# Patient Record
Sex: MALE | URBAN - METROPOLITAN AREA
[De-identification: names, ages, dates, MRNs, and addresses within clinical notes are randomized per-mention and may not be internally consistent; named-entity substitution may affect disease eponyms.]

---

## 2022-11-18 ENCOUNTER — OFFICE (OUTPATIENT)
Dept: URBAN - METROPOLITAN AREA CLINIC 29 | Facility: CLINIC | Age: 82
Setting detail: OPHTHALMOLOGY
End: 2022-11-18
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
## 2022-11-18 PROCEDURE — NO SHOW FE NO SHOW FEE: Performed by: OPHTHALMOLOGY

## 2023-03-29 ENCOUNTER — OFFICE (OUTPATIENT)
Dept: URBAN - METROPOLITAN AREA CLINIC 30 | Facility: CLINIC | Age: 83
Setting detail: OPHTHALMOLOGY
End: 2023-03-29
Payer: MEDICARE

## 2023-03-29 DIAGNOSIS — H52.13: ICD-10-CM

## 2023-03-29 DIAGNOSIS — H01.002: ICD-10-CM

## 2023-03-29 DIAGNOSIS — H02.403: ICD-10-CM

## 2023-03-29 DIAGNOSIS — H01.001: ICD-10-CM

## 2023-03-29 DIAGNOSIS — H01.004: ICD-10-CM

## 2023-03-29 DIAGNOSIS — H16.223: ICD-10-CM

## 2023-03-29 DIAGNOSIS — Z96.1: ICD-10-CM

## 2023-03-29 DIAGNOSIS — H43.813: ICD-10-CM

## 2023-03-29 DIAGNOSIS — H01.005: ICD-10-CM

## 2023-03-29 DIAGNOSIS — H11.153: ICD-10-CM

## 2023-03-29 PROCEDURE — 92014 COMPRE OPH EXAM EST PT 1/>: CPT | Performed by: OPHTHALMOLOGY

## 2023-03-29 ASSESSMENT — REFRACTION_MANIFEST
OS_VA1: 20/30+
OS_SPHERE: -1.25
OD_CYLINDER: +0.25
OD_VA1: 20/25+
OD_CYLINDER: +1.00
OS_AXIS: 134
OD_VA1: 20/25
OS_VA1: 20/25
OD_SPHERE: -1.25
OD_AXIS: 180
OD_SPHERE: -1.00
OS_SPHERE: -0.75
OS_CYLINDER: +0.25
OU_VA: 20/20
OS_CYLINDER: +0.25
OS_AXIS: 055
OD_AXIS: 2

## 2023-03-29 ASSESSMENT — VISUAL ACUITY
OD_BCVA: 20/25
OS_BCVA: 20/30

## 2023-03-29 ASSESSMENT — KERATOMETRY
OD_K1POWER_DIOPTERS: 42.25
OS_K2POWER_DIOPTERS: 43.50
OD_AXISANGLE_DEGREES: 108
OS_K1POWER_DIOPTERS: 42.50
OS_AXISANGLE_DEGREES: 131
OD_K2POWER_DIOPTERS: 42.50

## 2023-03-29 ASSESSMENT — REFRACTION_AUTOREFRACTION
OS_AXIS: 134
OD_SPHERE: -1.25
OS_SPHERE: -0.75
OD_AXIS: 2
OD_CYLINDER: +1.00
OS_CYLINDER: +0.25

## 2023-03-29 ASSESSMENT — SPHEQUIV_DERIVED
OS_SPHEQUIV: -1.125
OD_SPHEQUIV: -0.875
OS_SPHEQUIV: -0.625
OD_SPHEQUIV: -0.75
OD_SPHEQUIV: -0.75
OS_SPHEQUIV: -0.625

## 2023-03-29 ASSESSMENT — AXIALLENGTH_DERIVED
OD_AL: 24.3709
OS_AL: 24.2307
OD_AL: 24.3191
OD_AL: 24.3191
OS_AL: 24.0269
OS_AL: 24.0269

## 2023-03-29 ASSESSMENT — REFRACTION_CURRENTRX
OD_SPHERE: +1.50
OS_OVR_VA: 20/
OS_SPHERE: +1.50
OS_CYLINDER: SPH
OD_OVR_VA: 20/
OD_CYLINDER: SPH

## 2023-03-29 ASSESSMENT — CORNEAL DYSTROPHY - POSTERIOR
OD_POSTERIORDYSTROPHY: T GUTTATA
OS_POSTERIORDYSTROPHY: T

## 2023-03-29 ASSESSMENT — LID POSITION - PTOSIS
OD_PTOSIS: 1+
OS_PTOSIS: 1+

## 2023-03-29 ASSESSMENT — LID EXAM ASSESSMENTS
OD_BLEPHARITIS: 2+
OS_BLEPHARITIS: 2+

## 2023-03-29 ASSESSMENT — CONFRONTATIONAL VISUAL FIELD TEST (CVF)
OD_FINDINGS: FULL
OS_FINDINGS: FULL

## 2023-03-29 ASSESSMENT — TONOMETRY
OD_IOP_MMHG: 14
OS_IOP_MMHG: 14

## 2023-03-29 ASSESSMENT — SUPERFICIAL PUNCTATE KERATITIS (SPK)
OD_SPK: 1+
OS_SPK: 1+

## 2023-10-25 ENCOUNTER — RX ONLY (RX ONLY)
Age: 83
End: 2023-10-25

## 2023-10-25 ENCOUNTER — OFFICE (OUTPATIENT)
Dept: URBAN - METROPOLITAN AREA CLINIC 30 | Facility: CLINIC | Age: 83
Setting detail: OPHTHALMOLOGY
End: 2023-10-25
Payer: MEDICARE

## 2023-10-25 DIAGNOSIS — H26.491: ICD-10-CM

## 2023-10-25 DIAGNOSIS — H02.403: ICD-10-CM

## 2023-10-25 DIAGNOSIS — H16.223: ICD-10-CM

## 2023-10-25 DIAGNOSIS — H01.001: ICD-10-CM

## 2023-10-25 PROCEDURE — 99214 OFFICE O/P EST MOD 30 MIN: CPT | Performed by: OPHTHALMOLOGY

## 2023-10-25 ASSESSMENT — AXIALLENGTH_DERIVED
OS_AL: 24.0776
OS_AL: 24.2307
OD_AL: 24.3191
OD_AL: 24.3709

## 2023-10-25 ASSESSMENT — LID POSITION - PTOSIS
OD_PTOSIS: 1+
OS_PTOSIS: 1+

## 2023-10-25 ASSESSMENT — REFRACTION_CURRENTRX
OS_OVR_VA: 20/
OS_SPHERE: +1.50
OS_CYLINDER: SPH
OD_CYLINDER: SPH
OD_SPHERE: +1.50
OD_OVR_VA: 20/

## 2023-10-25 ASSESSMENT — SUPERFICIAL PUNCTATE KERATITIS (SPK)
OS_SPK: 1+
OD_SPK: 1+

## 2023-10-25 ASSESSMENT — SPHEQUIV_DERIVED
OS_SPHEQUIV: -1.125
OD_SPHEQUIV: -0.75
OS_SPHEQUIV: -0.75
OD_SPHEQUIV: -0.875

## 2023-10-25 ASSESSMENT — CONFRONTATIONAL VISUAL FIELD TEST (CVF)
OS_FINDINGS: FULL
OD_FINDINGS: FULL

## 2023-10-25 ASSESSMENT — REFRACTION_MANIFEST
OS_VA1: 20/25
OD_SPHERE: -1.00
OD_AXIS: 180
OD_CYLINDER: +0.25
OS_AXIS: 055
OD_AXIS: 180
OU_VA: 20/20
OD_CYLINDER: +1.00
OS_CYLINDER: +0.25
OD_SPHERE: -1.25
OS_SPHERE: -0.75
OS_SPHERE: -1.25
OS_VA1: 20/30+
OD_VA1: 20/25+
OD_VA1: 20/100

## 2023-10-25 ASSESSMENT — CORNEAL DYSTROPHY - POSTERIOR
OS_POSTERIORDYSTROPHY: T
OD_POSTERIORDYSTROPHY: T GUTTATA

## 2023-10-25 ASSESSMENT — REFRACTION_AUTOREFRACTION
OS_SPHERE: -1.00
OS_AXIS: 129
OS_CYLINDER: +0.50
OD_SPHERE: UNABLE

## 2023-10-25 ASSESSMENT — KERATOMETRY
OD_K2POWER_DIOPTERS: 42.50
OS_K2POWER_DIOPTERS: 43.50
OS_AXISANGLE_DEGREES: 131
OD_K1POWER_DIOPTERS: 42.25
OD_AXISANGLE_DEGREES: 108
OS_K1POWER_DIOPTERS: 42.50

## 2023-10-25 ASSESSMENT — LID EXAM ASSESSMENTS
OD_BLEPHARITIS: 2+
OS_BLEPHARITIS: 2+

## 2023-10-25 ASSESSMENT — TONOMETRY
OD_IOP_MMHG: 14
OS_IOP_MMHG: 14

## 2023-10-25 ASSESSMENT — VISUAL ACUITY
OD_BCVA: 20/50-2
OS_BCVA: 20/100-2

## 2023-11-16 ENCOUNTER — OFFICE (OUTPATIENT)
Dept: URBAN - METROPOLITAN AREA CLINIC 29 | Facility: CLINIC | Age: 83
Setting detail: OPHTHALMOLOGY
End: 2023-11-16
Payer: MEDICARE

## 2023-11-16 ENCOUNTER — RX ONLY (RX ONLY)
Age: 83
End: 2023-11-16

## 2023-11-16 DIAGNOSIS — H26.491: ICD-10-CM

## 2023-11-16 PROBLEM — H01.001 BLEPHARITIS; RIGHT UPPER LID, LEFT UPPER LID: Status: ACTIVE | Noted: 2023-11-16

## 2023-11-16 PROBLEM — H00.11 CHALAZION; RIGHT UPPER LID: Status: ACTIVE | Noted: 2023-11-16

## 2023-11-16 PROBLEM — H01.004 BLEPHARITIS; RIGHT UPPER LID, LEFT UPPER LID: Status: ACTIVE | Noted: 2023-11-16

## 2023-11-16 PROBLEM — H59.021 LENS FRAGMENTS IN EYE FOLLOWING CATARACT SURGERY; RIGHT EYE: Status: ACTIVE | Noted: 2023-11-16

## 2023-11-16 PROCEDURE — 66821 AFTER CATARACT LASER SURGERY: CPT | Mod: RT | Performed by: OPHTHALMOLOGY

## 2023-11-16 ASSESSMENT — REFRACTION_CURRENTRX
OD_CYLINDER: SPH
OD_OVR_VA: 20/
OD_SPHERE: +1.50
OS_OVR_VA: 20/
OS_SPHERE: +1.50
OS_CYLINDER: SPH

## 2023-11-16 ASSESSMENT — CONFRONTATIONAL VISUAL FIELD TEST (CVF)
OD_FINDINGS: FULL
OS_FINDINGS: FULL

## 2023-11-16 ASSESSMENT — REFRACTION_AUTOREFRACTION
OS_SPHERE: -1.00
OS_AXIS: 129
OS_CYLINDER: +0.50
OD_SPHERE: UNABLE

## 2023-11-16 ASSESSMENT — SPHEQUIV_DERIVED
OD_SPHEQUIV: -0.875
OD_SPHEQUIV: -0.75
OS_SPHEQUIV: -0.75
OS_SPHEQUIV: -1.125

## 2023-11-16 ASSESSMENT — LID POSITION - PTOSIS
OS_PTOSIS: 1+
OD_PTOSIS: 1+

## 2023-11-16 ASSESSMENT — REFRACTION_MANIFEST
OU_VA: 20/20
OS_VA1: 20/25
OD_SPHERE: -1.25
OS_SPHERE: -0.75
OS_CYLINDER: +0.25
OD_AXIS: 180
OD_AXIS: 180
OD_VA1: 20/25+
OD_VA1: 20/100
OS_SPHERE: -1.25
OS_VA1: 20/30+
OD_CYLINDER: +1.00
OS_AXIS: 055
OD_CYLINDER: +0.25
OD_SPHERE: -1.00

## 2023-11-16 ASSESSMENT — SUPERFICIAL PUNCTATE KERATITIS (SPK)
OD_SPK: 1+
OS_SPK: 1+

## 2023-11-16 ASSESSMENT — CORNEAL DYSTROPHY - POSTERIOR
OD_POSTERIORDYSTROPHY: T GUTTATA
OS_POSTERIORDYSTROPHY: T

## 2023-11-16 ASSESSMENT — LID EXAM ASSESSMENTS
OD_BLEPHARITIS: 2+
OS_BLEPHARITIS: 2+

## 2024-02-02 ENCOUNTER — OFFICE (OUTPATIENT)
Dept: URBAN - METROPOLITAN AREA CLINIC 29 | Facility: CLINIC | Age: 84
Setting detail: OPHTHALMOLOGY
End: 2024-02-02
Payer: MEDICARE

## 2024-02-02 DIAGNOSIS — Z96.1: ICD-10-CM

## 2024-02-02 DIAGNOSIS — H01.001: ICD-10-CM

## 2024-02-02 DIAGNOSIS — H01.004: ICD-10-CM

## 2024-02-02 DIAGNOSIS — H52.13: ICD-10-CM

## 2024-02-02 DIAGNOSIS — H16.223: ICD-10-CM

## 2024-02-02 DIAGNOSIS — H43.813: ICD-10-CM

## 2024-02-02 DIAGNOSIS — H26.491: ICD-10-CM

## 2024-02-02 DIAGNOSIS — H59.021: ICD-10-CM

## 2024-02-02 DIAGNOSIS — H02.403: ICD-10-CM

## 2024-02-02 DIAGNOSIS — H11.153: ICD-10-CM

## 2024-02-02 PROCEDURE — 99024 POSTOP FOLLOW-UP VISIT: CPT | Performed by: OPHTHALMOLOGY

## 2024-02-02 ASSESSMENT — REFRACTION_MANIFEST
OD_SPHERE: -1.00
OD_CYLINDER: +1.00
OS_SPHERE: -1.25
OS_SPHERE: -0.75
OD_VA1: 20/25+
OS_CYLINDER: +0.25
OS_VA1: 20/30+
OS_AXIS: 055
OD_SPHERE: -1.25
OD_AXIS: 180
OD_CYLINDER: +0.25
OS_VA1: 20/25
OD_VA1: 20/100
OD_AXIS: 180
OU_VA: 20/20

## 2024-02-02 ASSESSMENT — REFRACTION_AUTOREFRACTION
OS_AXIS: 129
OS_SPHERE: -1.00
OS_CYLINDER: +0.50
OD_SPHERE: UNABLE

## 2024-02-02 ASSESSMENT — SPHEQUIV_DERIVED
OS_SPHEQUIV: -1.125
OS_SPHEQUIV: -0.75
OD_SPHEQUIV: -0.75
OD_SPHEQUIV: -0.875

## 2024-02-02 ASSESSMENT — LID EXAM ASSESSMENTS
OD_BLEPHARITIS: 2+
OS_BLEPHARITIS: 2+

## 2024-02-02 ASSESSMENT — LID POSITION - PTOSIS
OS_PTOSIS: 1+
OD_PTOSIS: 1+

## 2024-02-02 ASSESSMENT — CONFRONTATIONAL VISUAL FIELD TEST (CVF)
OD_FINDINGS: FULL
OS_FINDINGS: FULL

## 2024-02-02 ASSESSMENT — CORNEAL DYSTROPHY - POSTERIOR
OD_POSTERIORDYSTROPHY: T GUTTATA
OS_POSTERIORDYSTROPHY: T

## 2024-02-02 ASSESSMENT — REFRACTION_CURRENTRX
OS_OVR_VA: 20/
OD_CYLINDER: SPH
OD_SPHERE: +1.50
OS_SPHERE: +1.50
OS_CYLINDER: SPH
OD_OVR_VA: 20/

## 2024-02-02 ASSESSMENT — SUPERFICIAL PUNCTATE KERATITIS (SPK)
OD_SPK: 1+
OS_SPK: 1+

## 2024-09-04 ENCOUNTER — TRANSCRIPTION ENCOUNTER (OUTPATIENT)
Age: 84
End: 2024-09-04

## 2024-09-06 ENCOUNTER — TRANSCRIPTION ENCOUNTER (OUTPATIENT)
Age: 84
End: 2024-09-06

## 2024-09-09 ENCOUNTER — APPOINTMENT (OUTPATIENT)
Dept: CARE COORDINATION | Facility: HOME HEALTH | Age: 84
End: 2024-09-09
Payer: MEDICARE

## 2024-09-09 VITALS
SYSTOLIC BLOOD PRESSURE: 135 MMHG | TEMPERATURE: 98.6 F | OXYGEN SATURATION: 96 % | DIASTOLIC BLOOD PRESSURE: 78 MMHG | RESPIRATION RATE: 18 BRPM | HEART RATE: 71 BPM

## 2024-09-09 DIAGNOSIS — E78.5 HYPERLIPIDEMIA, UNSPECIFIED: ICD-10-CM

## 2024-09-09 DIAGNOSIS — J18.9 PNEUMONIA, UNSPECIFIED ORGANISM: ICD-10-CM

## 2024-09-09 DIAGNOSIS — I10 ESSENTIAL (PRIMARY) HYPERTENSION: ICD-10-CM

## 2024-09-09 PROBLEM — Z00.00 ENCOUNTER FOR PREVENTIVE HEALTH EXAMINATION: Status: ACTIVE | Noted: 2024-09-09

## 2024-09-09 PROCEDURE — 99349 HOME/RES VST EST MOD MDM 40: CPT

## 2024-09-09 RX ORDER — BENAZEPRIL HYDROCHLORIDE 5 MG/1
5 TABLET ORAL
Refills: 0 | Status: ACTIVE | COMMUNITY

## 2024-09-09 RX ORDER — AMLODIPINE BESYLATE 10 MG/1
10 TABLET ORAL
Refills: 0 | Status: ACTIVE | COMMUNITY

## 2024-09-09 RX ORDER — ALLOPURINOL 300 MG/1
300 TABLET ORAL
Refills: 0 | Status: ACTIVE | COMMUNITY

## 2024-09-09 RX ORDER — ROSUVASTATIN CALCIUM 40 MG/1
40 TABLET, FILM COATED ORAL
Refills: 0 | Status: ACTIVE | COMMUNITY

## 2024-09-09 NOTE — PHYSICAL EXAM
[No Acute Distress] : no acute distress [Well Nourished] : well nourished [Well Developed] : well developed [Well-Appearing] : well-appearing [Normal Sclera/Conjunctiva] : normal sclera/conjunctiva [Normal Outer Ear/Nose] : the outer ears and nose were normal in appearance [No JVD] : no jugular venous distention [No Respiratory Distress] : no respiratory distress  [No Accessory Muscle Use] : no accessory muscle use [Bibasilar Rales/Crackles] : bibasilar rales/crackles were heard [Normal Rate] : normal rate  [Regular Rhythm] : with a regular rhythm [Normal S1, S2] : normal S1 and S2 [No Edema] : there was no peripheral edema [Soft] : abdomen soft [Non Tender] : non-tender [Non-distended] : non-distended [Normal Bowel Sounds] : normal bowel sounds [No Rash] : no rash [Normal Gait] : normal gait [Normal Affect] : the affect was normal [Normal Insight/Judgement] : insight and judgment were intact

## 2024-09-09 NOTE — REVIEW OF SYSTEMS
[Fever] : no fever [Chills] : no chills [Fatigue] : fatigue [Hot Flashes] : no hot flashes [Night Sweats] : no night sweats [Chest Pain] : no chest pain [Palpitations] : no palpitations [Claudication] : no  leg claudication [Lower Ext Edema] : no lower extremity edema [Orthopena] : no orthopnea [Wheezing] : no wheezing [Cough] : cough [Dyspnea on Exertion] : not dyspnea on exertion [Abdominal Pain] : no abdominal pain [Nausea] : no nausea [Constipation] : no constipation [Diarrhea] : no diarrhea [Vomiting] : no vomiting [Dysuria] : no dysuria [Incontinence] : no incontinence [Hematuria] : no hematuria [Joint Pain] : no joint pain [Joint Stiffness] : no joint stiffness [Back Pain] : no back pain [Itching] : no itching [Skin Rash] : no skin rash [Headache] : no headache [Dizziness] : no dizziness [Confusion] : no confusion [Memory Loss] : no memory loss [Suicidal] : not suicidal [Insomnia] : no insomnia [Anxiety] : no anxiety [Depression] : no depression [Easy Bleeding] : no easy bleeding [Easy Bruising] : no easy bruising [Negative] : ENT

## 2024-09-09 NOTE — HISTORY OF PRESENT ILLNESS
[Spouse] : spouse [FreeTextEntry1] : Follow-up for discharge from [facility] for PNA.  [de-identified] : Patient is a 83 year old male enrolled in the [employer] program with a history of HTN, HLD, gout, microscopic hematuria, bilateral sensorineural hearing loss, large hiatal hernia, systolic heart murmur. Patient was admitted to [facility] for PNA.    Hospital chart reviewed and copied as per [facility] Discharge Summary: "Mr. Wooten is a 83yoM with PMH HTN, HLD, gout, microscopic hematuria, bilateral sensorineural hearing loss, large hiatal hernia, systolic heart murmur who presents with cough for about a week, worsening at night for the past 1-2 days with fever for a day, a/w acute hypoxemic respiratory failure due to LLL pna".    Patient observed via home visit and is alert and oriented x 3, in no acute distress. Patient states they are feeling well today. Patient states they are eating and drinking well. Patient reports compliance with and completion of prescribed antibiotics. Reports mild residual cough but improved. States he is exercising as tolerated. Reports he has f/u with PCP for 9/13/24. Denies any fever, chills, abdominal pain, palpitations, nausea, vomiting, diarrhea, lightheadedness, dizziness, shortness of breath, or chest pain.

## 2024-09-09 NOTE — ASSESSMENT
[FreeTextEntry1] : Patient is a 83 year old male enrolled in the [employer] program with a history of HTN, HLD, gout, microscopic hematuria, bilateral sensorineural hearing loss, large hiatal hernia, systolic heart murmur. Patient was admitted to [facility] for PNA.    Hospital chart reviewed and copied as per [facility] Discharge Summary: "Mr. Wooten is a 83yoM with PMH HTN, HLD, gout, microscopic hematuria, bilateral sensorineural hearing loss, large hiatal hernia, systolic heart murmur who presents with cough for about a week, worsening at night for the past 1-2 days with fever for a day, a/w acute hypoxemic respiratory failure due to LLL pna".    Patient observed via home visit and is alert and oriented x 3, in no acute distress. Patient states they are feeling well today. Patient states they are eating and drinking well. Patient reports compliance with and completion of prescribed antibiotics. Reports mild residual cough but improved. States he is exercising as tolerated. Reports he has f/u with PCP for 9/13/24. Denies any fever, chills, abdominal pain, palpitations, nausea, vomiting, diarrhea, lightheadedness, dizziness, shortness of breath, or chest pain.

## 2024-09-18 ENCOUNTER — TRANSCRIPTION ENCOUNTER (OUTPATIENT)
Age: 84
End: 2024-09-18

## 2024-09-25 ENCOUNTER — TRANSCRIPTION ENCOUNTER (OUTPATIENT)
Age: 84
End: 2024-09-25

## 2024-10-02 ENCOUNTER — TRANSCRIPTION ENCOUNTER (OUTPATIENT)
Age: 84
End: 2024-10-02

## 2024-11-08 ENCOUNTER — OFFICE (OUTPATIENT)
Dept: URBAN - METROPOLITAN AREA CLINIC 29 | Facility: CLINIC | Age: 84
Setting detail: OPHTHALMOLOGY
End: 2024-11-08
Payer: MEDICARE

## 2024-11-08 DIAGNOSIS — H59.021: ICD-10-CM

## 2024-11-08 DIAGNOSIS — Z96.1: ICD-10-CM

## 2024-11-08 DIAGNOSIS — H16.223: ICD-10-CM

## 2024-11-08 DIAGNOSIS — H26.491: ICD-10-CM

## 2024-11-08 PROBLEM — H35.40 PERIPAPILLARY ATROPHY: Status: ACTIVE | Noted: 2024-11-08

## 2024-11-08 PROCEDURE — 92014 COMPRE OPH EXAM EST PT 1/>: CPT | Performed by: OPHTHALMOLOGY

## 2024-11-08 ASSESSMENT — REFRACTION_MANIFEST
OD_AXIS: 180
OD_VA1: 20/25+
OU_VA: 20/20
OS_AXIS: 055
OS_CYLINDER: +0.25
OS_VA1: 20/30+
OD_SPHERE: -1.00
OD_AXIS: 180
OD_SPHERE: -1.25
OD_VA1: 20/100
OS_SPHERE: -1.25
OS_SPHERE: -0.75
OD_CYLINDER: +0.25
OS_VA1: 20/25
OD_CYLINDER: +1.00

## 2024-11-08 ASSESSMENT — LID POSITION - PTOSIS
OS_PTOSIS: 1+
OD_PTOSIS: 1+

## 2024-11-08 ASSESSMENT — REFRACTION_CURRENTRX
OD_CYLINDER: SPH
OD_SPHERE: +1.50
OS_OVR_VA: 20/
OS_CYLINDER: SPH
OD_OVR_VA: 20/
OS_SPHERE: +1.50

## 2024-11-08 ASSESSMENT — VISUAL ACUITY
OD_BCVA: 20/60+2
OS_BCVA: 20/40+2

## 2024-11-08 ASSESSMENT — KERATOMETRY
OS_K2POWER_DIOPTERS: 43.50
OD_AXISANGLE_DEGREES: 108
OS_AXISANGLE_DEGREES: 131
OD_K1POWER_DIOPTERS: 42.25
OS_K1POWER_DIOPTERS: 42.50
OD_K2POWER_DIOPTERS: 42.50

## 2024-11-08 ASSESSMENT — CONFRONTATIONAL VISUAL FIELD TEST (CVF)
OS_FINDINGS: FULL
OD_FINDINGS: FULL

## 2024-11-08 ASSESSMENT — SUPERFICIAL PUNCTATE KERATITIS (SPK)
OD_SPK: 1+
OS_SPK: 1+

## 2024-11-08 ASSESSMENT — REFRACTION_AUTOREFRACTION
OS_CYLINDER: +0.50
OD_SPHERE: UNABLE
OS_AXIS: 129
OS_SPHERE: -1.00

## 2024-11-08 ASSESSMENT — LID EXAM ASSESSMENTS
OD_BLEPHARITIS: 2+
OS_BLEPHARITIS: 2+

## 2024-11-08 ASSESSMENT — CORNEAL DYSTROPHY - POSTERIOR
OS_POSTERIORDYSTROPHY: T
OD_POSTERIORDYSTROPHY: T GUTTATA